# Patient Record
Sex: MALE | Race: WHITE | HISPANIC OR LATINO | Employment: FULL TIME | URBAN - METROPOLITAN AREA
[De-identification: names, ages, dates, MRNs, and addresses within clinical notes are randomized per-mention and may not be internally consistent; named-entity substitution may affect disease eponyms.]

---

## 2018-11-28 ENCOUNTER — APPOINTMENT (OUTPATIENT)
Dept: URGENT CARE | Age: 23
End: 2018-11-28
Payer: OTHER MISCELLANEOUS

## 2018-11-28 ENCOUNTER — HOSPITAL ENCOUNTER (OUTPATIENT)
Dept: RADIOLOGY | Age: 23
Discharge: HOME | End: 2018-11-28
Attending: PREVENTIVE MEDICINE
Payer: OTHER MISCELLANEOUS

## 2018-11-28 ENCOUNTER — TRANSCRIBE ORDERS (OUTPATIENT)
Dept: ADMINISTRATIVE | Age: 23
End: 2018-11-28

## 2018-11-28 DIAGNOSIS — T14.90XA INJURY: Primary | ICD-10-CM

## 2018-11-28 DIAGNOSIS — T14.90XA INJURY: ICD-10-CM

## 2018-11-28 PROCEDURE — G0382 LEV 3 HOSP TYPE B ED VISIT: HCPCS

## 2018-11-28 PROCEDURE — 90715 TDAP VACCINE 7 YRS/> IM: CPT | Performed by: PREVENTIVE MEDICINE

## 2018-11-28 PROCEDURE — 70450 CT HEAD/BRAIN W/O DYE: CPT

## 2018-11-28 PROCEDURE — 90471 IMMUNIZATION ADMIN: CPT | Performed by: PREVENTIVE MEDICINE

## 2018-11-28 PROCEDURE — 99283 EMERGENCY DEPT VISIT LOW MDM: CPT

## 2018-11-30 ENCOUNTER — APPOINTMENT (OUTPATIENT)
Dept: URGENT CARE | Age: 23
End: 2018-11-30
Payer: OTHER MISCELLANEOUS

## 2018-11-30 PROCEDURE — 99214 OFFICE O/P EST MOD 30 MIN: CPT | Performed by: PREVENTIVE MEDICINE

## 2018-12-06 ENCOUNTER — APPOINTMENT (OUTPATIENT)
Dept: URGENT CARE | Age: 23
End: 2018-12-06
Payer: OTHER MISCELLANEOUS

## 2018-12-06 PROCEDURE — 99213 OFFICE O/P EST LOW 20 MIN: CPT | Performed by: PREVENTIVE MEDICINE

## 2022-04-21 ENCOUNTER — HOSPITAL ENCOUNTER (OUTPATIENT)
Dept: ULTRASOUND IMAGING | Facility: HOSPITAL | Age: 27
Discharge: HOME | End: 2022-04-21
Payer: COMMERCIAL

## 2022-04-21 DIAGNOSIS — R10.13 EPIGASTRIC PAIN: ICD-10-CM

## 2022-04-21 DIAGNOSIS — K21.9 GASTROESOPHAGEAL REFLUX DISEASE, UNSPECIFIED WHETHER ESOPHAGITIS PRESENT: ICD-10-CM

## 2022-04-21 PROCEDURE — 76700 US EXAM ABDOM COMPLETE: CPT

## 2024-05-13 ENCOUNTER — OFFICE VISIT (OUTPATIENT)
Dept: UROLOGY | Facility: CLINIC | Age: 29
End: 2024-05-13
Payer: COMMERCIAL

## 2024-05-13 VITALS
SYSTOLIC BLOOD PRESSURE: 126 MMHG | HEIGHT: 71 IN | OXYGEN SATURATION: 99 % | HEART RATE: 85 BPM | WEIGHT: 163 LBS | DIASTOLIC BLOOD PRESSURE: 80 MMHG | BODY MASS INDEX: 22.82 KG/M2

## 2024-05-13 DIAGNOSIS — F52.4 PREMATURE EJACULATION: Primary | ICD-10-CM

## 2024-05-13 PROCEDURE — 99204 OFFICE O/P NEW MOD 45 MIN: CPT | Performed by: UROLOGY

## 2024-05-13 RX ORDER — CLOTRIMAZOLE AND BETAMETHASONE DIPROPIONATE 10; .64 MG/G; MG/G
CREAM TOPICAL
COMMUNITY
Start: 2024-03-28

## 2024-05-13 RX ORDER — PAROXETINE HYDROCHLORIDE 20 MG/1
20 TABLET, FILM COATED ORAL DAILY
Qty: 30 TABLET | Refills: 11 | Status: SHIPPED | OUTPATIENT
Start: 2024-05-13 | End: 2025-05-08

## 2024-05-13 RX ORDER — ALBUTEROL SULFATE 2.5 MG/3ML
2.5 SOLUTION RESPIRATORY (INHALATION) EVERY 4 HOURS PRN
COMMUNITY

## 2024-05-13 NOTE — PROGRESS NOTES
"5/13/2024    Kevin Good  1995  [id number]      1. Premature ejaculation  -     PARoxetine (PAXIL) 20 mg tablet; Take 1 tablet (20 mg total) by mouth daily    We reviewed his concerns.  Interestingly he is able to complete intercourse with erection after this premature ejaculation then ejaculate again.  However, we discussed potential treatment for this.  We discussed observation without medication.  We also discussed SSRI use which has shown some efficacy in this regard.  Standard treatment has been Paxil 20 mg.  We discussed potential side effects as well.  The other option might be PDE 5 inhibitor which may allow for prolonged erection despite ejaculation.  This would not necessarily assist with the premature ejaculation however.  After discussion he would like to try SSRI.  Prescription sent to his pharmacy.    He would also like further evaluation for his urinary symptoms.  He was wondering about possible damage he may have because internally.  This can be evaluated with cystoscopy.      History of Present Illness  Kevin is a 29 y.o. male with multiple complaints.  He reports premature ejaculation for as long as he can remember.  Typically ejaculates within 30 seconds after erection.  He is often able to maintain erection afterward for sexual function, but it is never less bothersome.  He is attempted to affect this by pinching the penis, however as a result he feels that he may have caused some damage over time.  He reports feeling of incomplete emptying of the bladder and need to completely empty after he finishes voiding, which is concerning at his age.  Additionally, he reports penile deviation to the left with erection.  He reports that it is not curved but straight and to the left side \"about 1 inch.\"  No specific trauma.            Review of Systems   Constitutional: Negative.    HENT: Negative.     Respiratory: Negative.     Cardiovascular: Negative.    Gastrointestinal: Negative.  "   Genitourinary:         As per HPI   Musculoskeletal: Negative.    Skin: Negative.    Neurological: Negative.    Hematological: Negative.        Past Medical History  Past Medical History:   Diagnosis Date   • COVID-19 08/30/2022   • Eosinophilic esophagitis    • GERD (gastroesophageal reflux disease)        Past Social History  Past Surgical History:   Procedure Laterality Date   • EGD  05/03/2022    Normal-biopsy esophagus positive for eosinophilic esophagitis, biopsy stomach negative for H. pylori by Dr. Harrison       Past Family History  History reviewed. No pertinent family history.    Past Social history  Social History     Socioeconomic History   • Marital status: /Civil Union     Spouse name: Not on file   • Number of children: Not on file   • Years of education: Not on file   • Highest education level: Not on file   Occupational History   • Occupation:      Employer: [employer]   Tobacco Use   • Smoking status: Never   • Smokeless tobacco: Never   Vaping Use   • Vaping status: Never Used   Substance and Sexual Activity   • Alcohol use: Not on file   • Drug use: Not on file   • Sexual activity: Not on file   Other Topics Concern   • Not on file   Social History Narrative   • Not on file     Social Determinants of Health     Financial Resource Strain: Not on file   Food Insecurity: Not on file   Transportation Needs: Not on file   Physical Activity: Not on file   Stress: Not on file   Social Connections: Not on file   Intimate Partner Violence: Not on file   Housing Stability: Not on file     Social History     Tobacco Use   Smoking Status Never   Smokeless Tobacco Never       Current Medications  Current Outpatient Medications   Medication Sig Dispense Refill   • albuterol (2.5 mg/3 mL) 0.083 % nebulizer solution Inhale 2.5 mg every 4 (four) hours as needed     • clotrimazole-betamethasone (LOTRISONE) 1-0.05 % cream      • fluticasone (Flovent HFA) 110 MCG/ACT inhaler  "Inhale 2 puffs 2 (two) times a day as needed Rinse mouth after use.     • fluticasone (Flovent HFA) 220 mcg/act inhaler Inhale 2 puffs  in the morning and 2 puffs in the evening. Swallow do not inhale Rinse mouth after use.. 12 g 2   • hydrOXYzine HCL (ATARAX) 10 mg tablet Take 10 mg by mouth every 6 (six) hours as needed for itching     • PARoxetine (PAXIL) 20 mg tablet Take 1 tablet (20 mg total) by mouth daily 30 tablet 11   • pantoprazole (PROTONIX) 40 mg tablet Take 1 tablet (40 mg total) by mouth daily 30 tablet 5     No current facility-administered medications for this visit.       Allergies  Allergies   Allergen Reactions   • Shrimp Extract Allergy Skin Test - Food Allergy Anaphylaxis   • Soy Allergy - Food Allergy Hives       Past Medical History, Social History, Family History, medications and allergies were reviewed.    Vitals  Vitals:    05/13/24 0739   BP: 126/80   BP Location: Left arm   Patient Position: Sitting   Cuff Size: Standard   Pulse: 85   SpO2: 99%   Weight: 73.9 kg (163 lb)   Height: 5' 11\" (1.803 m)       Physical Exam  Vitals reviewed.   Constitutional:       Appearance: He is well-developed.   HENT:      Head: Normocephalic and atraumatic.   Eyes:      Conjunctiva/sclera: Conjunctivae normal.   Cardiovascular:      Rate and Rhythm: Normal rate.   Pulmonary:      Effort: Pulmonary effort is normal.   Abdominal:      Palpations: Abdomen is soft.   Genitourinary:     Penis: Normal.       Testes: Normal.   Musculoskeletal:         General: Normal range of motion.   Skin:     General: Skin is warm and dry.   Neurological:      Mental Status: He is alert and oriented to person, place, and time.   Psychiatric:         Mood and Affect: Mood normal.           Results  No results found for: \"PSA\"  Lab Results   Component Value Date    CALCIUM 10.0 11/08/2023    K 4.0 11/08/2023    CO2 23 11/08/2023     11/08/2023    BUN 23 11/08/2023    CREATININE 1.06 11/08/2023     No results found for: " "\"WBC\", \"HGB\", \"HCT\", \"MCV\", \"PLT\"        "

## 2024-08-08 DIAGNOSIS — Z00.6 ENCOUNTER FOR EXAMINATION FOR NORMAL COMPARISON OR CONTROL IN CLINICAL RESEARCH PROGRAM: ICD-10-CM

## 2025-05-09 PROBLEM — S12.300A C4 CERVICAL FRACTURE (HCC): Status: ACTIVE | Noted: 2025-05-09
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
